# Patient Record
Sex: MALE | URBAN - METROPOLITAN AREA
[De-identification: names, ages, dates, MRNs, and addresses within clinical notes are randomized per-mention and may not be internally consistent; named-entity substitution may affect disease eponyms.]

---

## 2017-06-06 ENCOUNTER — IMPORTED ENCOUNTER (OUTPATIENT)
Dept: URBAN - METROPOLITAN AREA CLINIC 1 | Facility: CLINIC | Age: 77
End: 2017-06-06

## 2017-06-06 PROBLEM — H35.033: Noted: 2017-06-06

## 2017-06-06 PROBLEM — H25.813: Noted: 2017-06-06

## 2017-06-06 PROBLEM — H04.123: Noted: 2017-06-06

## 2017-06-06 PROBLEM — H43.813: Noted: 2017-06-06

## 2017-06-06 PROBLEM — R73.09: Noted: 2017-06-06

## 2017-06-06 PROCEDURE — 92014 COMPRE OPH EXAM EST PT 1/>: CPT

## 2017-06-06 NOTE — PATIENT DISCUSSION
1.  DM Type II (Diet Controlled) without sign of diabetic retinopathy and no blot heme on dilated retinal examination today OU No Macular Edema:  Discussed the pathophysiology of diabetes and its effect on the eye and risk of blindness. Stressed the importance of strong glucose control. Advised of importance of at least yearly dilated examinations but to contact us immediately for any problems or concerns. 2. Cataract OU: Observe for now without intervention. The patient was advised to contact us if any change or worsening of vision3. Dry Eyes OU - Cont ATs TID OU Routinely. 4.  PVD OU - RD precautions. 5.  Hypertensive Retinopathy OU- Stable continue HTN ControlLetter to PCP Return for an appointment in 1 yr 30 glare with Dr. Rashida Dinh.

## 2018-06-07 ENCOUNTER — IMPORTED ENCOUNTER (OUTPATIENT)
Dept: URBAN - METROPOLITAN AREA CLINIC 1 | Facility: CLINIC | Age: 78
End: 2018-06-07

## 2018-06-07 PROBLEM — H25.813: Noted: 2018-06-07

## 2018-06-07 PROBLEM — H35.033: Noted: 2018-06-07

## 2018-06-07 PROBLEM — Z79.84: Noted: 2018-06-07

## 2018-06-07 PROBLEM — H04.123: Noted: 2018-06-07

## 2018-06-07 PROBLEM — H43.813: Noted: 2018-06-07

## 2018-06-07 PROBLEM — E11.9: Noted: 2018-06-07

## 2018-06-07 PROBLEM — H16.143: Noted: 2018-06-07

## 2018-06-07 PROCEDURE — 92014 COMPRE OPH EXAM EST PT 1/>: CPT

## 2018-06-07 PROCEDURE — 92015 DETERMINE REFRACTIVE STATE: CPT

## 2018-06-07 NOTE — PATIENT DISCUSSION
1.  DM Type II (Controlled by oral medications - Metformin) -- Without sign of diabetic retinopathy and no blot heme on dilated retinal examination today OU No Macular Edema:  Discussed the pathophysiology of diabetes and its effect on the eye and risk of blindness. Stressed the importance of strong glucose control. Advised of importance of at least yearly dilated examinations but to contact us immediately for any problems or concerns. 2. Cataract OU: Observe for now without intervention. The patient was advised to contact us if any change or worsening of vision3. DAILY w/ PEK OU-The use/continuation of artificial tears were recommended. 4.  Hypertensive Retinopathy OU- Stable continue HTN Control5. PVD OU - RD precautions. Letter to Dr. Elinor Ro. Finalized Glasses MRx & given to patient. Return for an appointment in 6 MO 10 / Constantine Santiago / Jessica Mcintyre with Dr. Sierra Thomson.

## 2018-12-06 ENCOUNTER — IMPORTED ENCOUNTER (OUTPATIENT)
Dept: URBAN - METROPOLITAN AREA CLINIC 1 | Facility: CLINIC | Age: 78
End: 2018-12-06

## 2018-12-06 PROBLEM — H25.813: Noted: 2018-12-06

## 2018-12-06 PROBLEM — H35.033: Noted: 2018-12-06

## 2018-12-06 PROBLEM — Z79.84: Noted: 2018-12-06

## 2018-12-06 PROBLEM — H43.813: Noted: 2018-12-06

## 2018-12-06 PROBLEM — E11.9: Noted: 2018-12-06

## 2018-12-06 PROBLEM — H16.143: Noted: 2018-12-06

## 2018-12-06 PROBLEM — H04.123: Noted: 2018-12-06

## 2018-12-06 PROCEDURE — 99213 OFFICE O/P EST LOW 20 MIN: CPT

## 2018-12-06 NOTE — PATIENT DISCUSSION
1.  DM Type II without sign of diabetic retinopathy and no blot heme on dilated retinal examination today OU No Macular Edema: Controlled. Discussed the pathophysiology of diabetes and its effect on the eye and risk of blindness. Stressed the importance of strong glucose control. Advised of importance of at least yearly dilated examinations but to contact us immediately for any problems or concerns. 2. Type II diabetes controlled by oral medications. 3.  Cataract OU: Observe for now without intervention. The patient was advised to contact us if any change or worsening of vision4. DAILY w/ PEK OU-The importance of artificial tears were discussed and recommended OU TID. 5.  PVD OU- Old stable. 6.  GR I Hypertensive Retinopathy OU- Stable continue HTN Control7. Return for an appointment for 30/glare in 1 year with Dr. Viktoriya Newton.

## 2019-10-22 NOTE — PATIENT DISCUSSION
Jensen Visual Field 36 point screen: I have reviewed the visual fields both taped and untaped on this patient which demonstrate significant obstruction of the patient's peripheral visual field on both eyes.

## 2019-11-22 ENCOUNTER — ANESTHESIA EVENT (OUTPATIENT)
Dept: SURGERY | Age: 79
End: 2019-11-22
Payer: MEDICARE

## 2019-11-22 NOTE — PERIOP NOTES
PAT - SURGICAL PRE-ADMISSION INSTRUCTIONS    NAME:  Radha Jaime                                                          TODAY'S DATE:  11/22/2019    SURGERY DATE:  11/25/2019                                  SURGERY ARRIVAL TIME:   TBV    1. Do NOT eat or drink anything, including candy or gum, after MIDNIGHT on 11/24/2019 , unless you have specific instructions from your Surgeon or Anesthesia Provider to do so. 2. No smoking on the day of surgery. 3. No alcohol 24 hours prior to the day of surgery. 4. No recreational drugs for one week prior to the day of surgery. 5. Leave all valuables, including money/purse, at home. 6. Remove all jewelry, nail polish, makeup (including mascara); no lotions, powders, deodorant, or perfume/cologne/after shave. 7. Glasses/Contact lenses and Dentures may be worn to the hospital.  They will be removed prior to surgery. 8. Call your doctor if symptoms of a cold or illness develop within 24 ours prior to surgery. 9. AN ADULT MUST DRIVE YOU HOME AFTER OUTPATIENT SURGERY. 10. If you are having an OUTPATIENT procedure, please make arrangements for a responsible adult to be with you for 24 hours after your surgery. 11. If you are admitted to the hospital, you will be assigned to a bed after surgery is complete. Normally a family member will not be able to see you until you are in your assigned bed. 15. Family is encouraged to accompany you to the hospital.  We ask visitors in the treatment area to be limited to ONE person at a time to ensure patient privacy. EXCEPTIONS WILL BE MADE AS NEEDED. 15. Children under 12 are discouraged from entering the treatment area and need to be supervised by an adult when in the waiting room. Special Instructions: Take these medications the morning of surgery with a sip of water:  Adalat, HOLD oral diabetic medication on the MORNING OF surgery. , HOLD metformin/glucophage dose starting the EVENING BEFORE the day of surgery., STOP anticoagulants AT LEAST 1 WEEK PRIOR to your surgery or, follow other MD instructions:  No NSAIDS    Patient Prep:    shower with anti-bacterial soap    These surgical instructions were reviewed with Farideh Tellez during the PAT phone call. Directions: On the morning of surgery, please go to the [facility]. Enter the building from the [facility] entrance, 1st floor (next to the Emergency Room entrance). Take the elevator to the 2nd floor. Sign in at the Registration Desk.     If you have any questions and/or concerns, please do not hesitate to call:  (Prior to the day of surgery)  [facility] unit:  [phone]  (Day of surgery)  [facility] unit:  [phone]

## 2019-11-25 ENCOUNTER — ANESTHESIA (OUTPATIENT)
Dept: SURGERY | Age: 79
End: 2019-11-25
Payer: MEDICARE

## 2019-11-25 ENCOUNTER — HOSPITAL ENCOUNTER (OUTPATIENT)
Age: 79
Setting detail: OUTPATIENT SURGERY
Discharge: HOME | End: 2019-11-25
Attending: UROLOGY | Admitting: UROLOGY
Payer: MEDICARE

## 2019-11-25 VITALS
HEART RATE: 67 BPM | SYSTOLIC BLOOD PRESSURE: 115 MMHG | HEIGHT: 68 IN | OXYGEN SATURATION: 95 % | DIASTOLIC BLOOD PRESSURE: 74 MMHG | BODY MASS INDEX: 28.38 KG/M2 | TEMPERATURE: 97 F | RESPIRATION RATE: 18 BRPM | WEIGHT: 187.25 LBS

## 2019-11-25 LAB
GLUCOSE BLD STRIP.AUTO-MCNC: 131 MG/DL (ref 70–110)
GLUCOSE BLD STRIP.AUTO-MCNC: 144 MG/DL (ref 70–110)

## 2019-11-25 PROCEDURE — 76060000032 HC ANESTHESIA 0.5 TO 1 HR: Performed by: UROLOGY

## 2019-11-25 PROCEDURE — 74011250636 HC RX REV CODE- 250/636: Performed by: NURSE ANESTHETIST, CERTIFIED REGISTERED

## 2019-11-25 PROCEDURE — 74011250637 HC RX REV CODE- 250/637: Performed by: NURSE ANESTHETIST, CERTIFIED REGISTERED

## 2019-11-25 PROCEDURE — G0416 PROSTATE BIOPSY, ANY MTHD: HCPCS

## 2019-11-25 PROCEDURE — 77030003481 HC NDL BIOP GUN BARD -B: Performed by: UROLOGY

## 2019-11-25 PROCEDURE — 82962 GLUCOSE BLOOD TEST: CPT

## 2019-11-25 PROCEDURE — 76210000006 HC OR PH I REC 0.5 TO 1 HR: Performed by: UROLOGY

## 2019-11-25 PROCEDURE — 74011000250 HC RX REV CODE- 250: Performed by: NURSE ANESTHETIST, CERTIFIED REGISTERED

## 2019-11-25 PROCEDURE — 77030018846 HC SOL IRR STRL H20 ICUM -A: Performed by: UROLOGY

## 2019-11-25 PROCEDURE — 77030032490 HC SLV COMPR SCD KNE COVD -B: Performed by: UROLOGY

## 2019-11-25 PROCEDURE — 76010000138 HC OR TIME 0.5 TO 1 HR: Performed by: UROLOGY

## 2019-11-25 PROCEDURE — 88344 IMHCHEM/IMCYTCHM EA MLT ANTB: CPT

## 2019-11-25 PROCEDURE — 77030015736 HC BLLN ENDOCAV CIVC -B: Performed by: UROLOGY

## 2019-11-25 PROCEDURE — 77030012510 HC MSK AIRWY LMA TELE -B: Performed by: ANESTHESIOLOGY

## 2019-11-25 PROCEDURE — 74011250636 HC RX REV CODE- 250/636: Performed by: UROLOGY

## 2019-11-25 PROCEDURE — 76210000021 HC REC RM PH II 0.5 TO 1 HR: Performed by: UROLOGY

## 2019-11-25 PROCEDURE — 74011000250 HC RX REV CODE- 250: Performed by: UROLOGY

## 2019-11-25 RX ORDER — SODIUM CHLORIDE 0.9 % (FLUSH) 0.9 %
5-40 SYRINGE (ML) INJECTION EVERY 8 HOURS
Status: DISCONTINUED | OUTPATIENT
Start: 2019-11-25 | End: 2019-11-25 | Stop reason: HOSPADM

## 2019-11-25 RX ORDER — LIDOCAINE HYDROCHLORIDE 10 MG/ML
INJECTION INFILTRATION; PERINEURAL AS NEEDED
Status: DISCONTINUED | OUTPATIENT
Start: 2019-11-25 | End: 2019-11-25 | Stop reason: HOSPADM

## 2019-11-25 RX ORDER — LIDOCAINE HYDROCHLORIDE 10 MG/ML
0.1 INJECTION, SOLUTION EPIDURAL; INFILTRATION; INTRACAUDAL; PERINEURAL AS NEEDED
Status: DISCONTINUED | OUTPATIENT
Start: 2019-11-25 | End: 2019-11-25 | Stop reason: HOSPADM

## 2019-11-25 RX ORDER — ONDANSETRON 2 MG/ML
4 INJECTION INTRAMUSCULAR; INTRAVENOUS ONCE
Status: DISCONTINUED | OUTPATIENT
Start: 2019-11-25 | End: 2019-11-25 | Stop reason: HOSPADM

## 2019-11-25 RX ORDER — FENTANYL CITRATE 50 UG/ML
INJECTION, SOLUTION INTRAMUSCULAR; INTRAVENOUS AS NEEDED
Status: DISCONTINUED | OUTPATIENT
Start: 2019-11-25 | End: 2019-11-25 | Stop reason: HOSPADM

## 2019-11-25 RX ORDER — CEPHALEXIN 250 MG/1
500 CAPSULE ORAL 4 TIMES DAILY
Qty: 24 CAP | Refills: 0 | Status: SHIPPED | OUTPATIENT
Start: 2019-11-25 | End: 2019-11-28

## 2019-11-25 RX ORDER — ONDANSETRON 2 MG/ML
INJECTION INTRAMUSCULAR; INTRAVENOUS AS NEEDED
Status: DISCONTINUED | OUTPATIENT
Start: 2019-11-25 | End: 2019-11-25 | Stop reason: HOSPADM

## 2019-11-25 RX ORDER — TAMSULOSIN HYDROCHLORIDE 0.4 MG/1
0.4 CAPSULE ORAL DAILY
Qty: 90 CAP | Refills: 3 | Status: SHIPPED | OUTPATIENT
Start: 2019-11-25 | End: 2020-07-20

## 2019-11-25 RX ORDER — FENTANYL CITRATE 50 UG/ML
25 INJECTION, SOLUTION INTRAMUSCULAR; INTRAVENOUS AS NEEDED
Status: DISCONTINUED | OUTPATIENT
Start: 2019-11-25 | End: 2019-11-25 | Stop reason: HOSPADM

## 2019-11-25 RX ORDER — CEFAZOLIN SODIUM 2 G/50ML
2 SOLUTION INTRAVENOUS
Status: COMPLETED | OUTPATIENT
Start: 2019-11-25 | End: 2019-11-25

## 2019-11-25 RX ORDER — SODIUM CHLORIDE 0.9 % (FLUSH) 0.9 %
5-40 SYRINGE (ML) INJECTION AS NEEDED
Status: DISCONTINUED | OUTPATIENT
Start: 2019-11-25 | End: 2019-11-25 | Stop reason: HOSPADM

## 2019-11-25 RX ORDER — INSULIN LISPRO 100 [IU]/ML
INJECTION, SOLUTION INTRAVENOUS; SUBCUTANEOUS ONCE
Status: DISCONTINUED | OUTPATIENT
Start: 2019-11-25 | End: 2019-11-25 | Stop reason: HOSPADM

## 2019-11-25 RX ORDER — HYDROCODONE BITARTRATE AND ACETAMINOPHEN 5; 325 MG/1; MG/1
1 TABLET ORAL ONCE
Status: DISCONTINUED | OUTPATIENT
Start: 2019-11-25 | End: 2019-11-25 | Stop reason: HOSPADM

## 2019-11-25 RX ORDER — FAMOTIDINE 20 MG/1
20 TABLET, FILM COATED ORAL ONCE
Status: COMPLETED | OUTPATIENT
Start: 2019-11-25 | End: 2019-11-25

## 2019-11-25 RX ORDER — MAGNESIUM SULFATE 100 %
4 CRYSTALS MISCELLANEOUS AS NEEDED
Status: DISCONTINUED | OUTPATIENT
Start: 2019-11-25 | End: 2019-11-25 | Stop reason: HOSPADM

## 2019-11-25 RX ORDER — SODIUM CHLORIDE, SODIUM LACTATE, POTASSIUM CHLORIDE, CALCIUM CHLORIDE 600; 310; 30; 20 MG/100ML; MG/100ML; MG/100ML; MG/100ML
25 INJECTION, SOLUTION INTRAVENOUS CONTINUOUS
Status: DISCONTINUED | OUTPATIENT
Start: 2019-11-25 | End: 2019-11-25 | Stop reason: HOSPADM

## 2019-11-25 RX ORDER — PROPOFOL 10 MG/ML
INJECTION, EMULSION INTRAVENOUS AS NEEDED
Status: DISCONTINUED | OUTPATIENT
Start: 2019-11-25 | End: 2019-11-25 | Stop reason: HOSPADM

## 2019-11-25 RX ORDER — BACITRACIN 500 [USP'U]/G
OINTMENT TOPICAL AS NEEDED
Status: DISCONTINUED | OUTPATIENT
Start: 2019-11-25 | End: 2019-11-25 | Stop reason: HOSPADM

## 2019-11-25 RX ORDER — LIDOCAINE HYDROCHLORIDE 20 MG/ML
INJECTION, SOLUTION EPIDURAL; INFILTRATION; INTRACAUDAL; PERINEURAL AS NEEDED
Status: DISCONTINUED | OUTPATIENT
Start: 2019-11-25 | End: 2019-11-25 | Stop reason: HOSPADM

## 2019-11-25 RX ORDER — SODIUM CHLORIDE, SODIUM LACTATE, POTASSIUM CHLORIDE, CALCIUM CHLORIDE 600; 310; 30; 20 MG/100ML; MG/100ML; MG/100ML; MG/100ML
50 INJECTION, SOLUTION INTRAVENOUS CONTINUOUS
Status: DISCONTINUED | OUTPATIENT
Start: 2019-11-25 | End: 2019-11-25 | Stop reason: HOSPADM

## 2019-11-25 RX ADMIN — FENTANYL CITRATE 25 MCG: 50 INJECTION, SOLUTION INTRAMUSCULAR; INTRAVENOUS at 09:16

## 2019-11-25 RX ADMIN — LIDOCAINE HYDROCHLORIDE 50 MG: 20 INJECTION, SOLUTION INTRAVENOUS at 08:42

## 2019-11-25 RX ADMIN — FENTANYL CITRATE 50 MCG: 50 INJECTION, SOLUTION INTRAMUSCULAR; INTRAVENOUS at 08:46

## 2019-11-25 RX ADMIN — SODIUM CHLORIDE, SODIUM LACTATE, POTASSIUM CHLORIDE, AND CALCIUM CHLORIDE 25 ML/HR: 600; 310; 30; 20 INJECTION, SOLUTION INTRAVENOUS at 08:06

## 2019-11-25 RX ADMIN — ONDANSETRON 4 MG: 2 SOLUTION INTRAMUSCULAR; INTRAVENOUS at 09:05

## 2019-11-25 RX ADMIN — PROPOFOL 120 MG: 10 INJECTION, EMULSION INTRAVENOUS at 08:42

## 2019-11-25 RX ADMIN — FENTANYL CITRATE 25 MCG: 50 INJECTION, SOLUTION INTRAMUSCULAR; INTRAVENOUS at 09:04

## 2019-11-25 RX ADMIN — FAMOTIDINE 20 MG: 20 TABLET ORAL at 08:06

## 2019-11-25 RX ADMIN — CEFAZOLIN SODIUM 2 G: 2 SOLUTION INTRAVENOUS at 08:47

## 2019-11-25 NOTE — ANESTHESIA PREPROCEDURE EVALUATION
Relevant Problems   No relevant active problems       Anesthetic History   No history of anesthetic complications            Review of Systems / Medical History  Patient summary reviewed and pertinent labs reviewed    Pulmonary          Smoker         Neuro/Psych   Within defined limits           Cardiovascular    Hypertension: well controlled        Dysrhythmias : atrial fibrillation      Exercise tolerance: >4 METS     GI/Hepatic/Renal                Endo/Other    Diabetes: well controlled, type 2    Arthritis and cancer (Prostate cancer)     Other Findings              Physical Exam    Airway  Mallampati: II  TM Distance: 4 - 6 cm  Neck ROM: normal range of motion   Mouth opening: Normal     Cardiovascular    Rhythm: irregular           Dental    Dentition: Full lower dentures and Full upper dentures     Pulmonary  Breath sounds clear to auscultation               Abdominal  GI exam deferred       Other Findings            Anesthetic Plan    ASA: 3  Anesthesia type: general          Induction: Intravenous  Anesthetic plan and risks discussed with: Patient

## 2019-11-25 NOTE — PROCEDURES
TRANSRECTAL ULTRASOUND AND PROSTATE NEEDLE BIOPSY NOTE    PSA:    PSA measured 12.52 ng/mL on 9/5/19, previously 7.04ng/mL on 1/7/2019. Prebiopsy diagnosis: [id number] Adenocarcinoma of the prostate, Judith 3+3=6    Postbiopsy diagnosis:  Same    Procedure performed:  Transrectal US and Prostate Needle Biopsy    Surgeon:  Dr. Alisa Rojas   Assistant: Jose Luis Bella MD PGY 2     [place] Procedure Pause:  1. Correct patient confirmed:  yes  2. Allergies confirmed:  yes  3. Patient taking antiplatelet medications:  yes   4. Patient taking anticoagulants:  no  5. Correct procedure and side confirmed and consent signed:  yes  6. Correct antibiotics confirmed:  yes      Consent: All risks, benefits and options were reviewed in detail and the patient agrees to procedure. Risks include but are not limited to bleeding, infection, sepsis, death, dysuria and others. Procedure:  He was placed in the dorsal lithotomy and prepped in a sterile fashio. An endorectal probe was positioned on the brachytherapy stepper and placed in the rectum. Anesthesia: Prostate Block - 10 mL of Xylocaine 1%  was injected in an appropriate fashion. Transverse and sagittal images of the prostate and seminal vesicles were obtained and measurements were recorded (50.3 cm^3). Using the needle guide on the brachytherapy stepper, a biopsy needle was used to obtain biopsies of the prostate. There were two Regions of Interest based on the patient's MRI - the anterior peripheral zone and the left posterior peripheral zone. Additional samples of these regions were taken. Twelve other biopsies were then taken in the standard fashion with sample cores of the transitional zone on the left and right. Tissue cores were placed in formalin jars and sent for pathologic review. The probe was removed and the patient was observed. Appropriate activity levels and timing of resuming sexual activity was reviewed.   Patient was warned of potential complications and with instructions on how to contact our office. This includes but not limited to elevated temperature, excessive bleeding per urethra or rectum as well as others. All questions were answered to his satisfaction. Findings:    FLAQUITA: prostate is 1.5-2+ enlarged, firm with no nodules   TRUS Volume:  50 cm3   PSA-Density: 0.25  Prostate capsule:   Hypoechoic Mass:  YES Location:  Anterior peripheral zone, left posterior peripheral zone   Seminal vesicles:  Normal and symmetrical      Biopsy Pattern:     RANULFO 1 anterior PZ:   4  RANULFO 2 L posterior PZ:  4      Base  Mid   Hamilton  TZ   Left  1  1  1  1     Right    1  1  1  1  Lateral left 1  1  1  0  Lateral right 1  1  1  0         Plan:   Follow up in 10 days to review biopsy results         Renetta Nickerson MD      Staff:  I performed this procedure with the assistance of Dr. Jazmin Jaime. I agree with her assessment and documentation of the procedure as presented above.     Camelia Stoddard MD

## 2019-11-25 NOTE — DISCHARGE INSTRUCTIONS
Blood in the urine is normal for several days and blood in the semen can be expected up to 4-6 weeks. Infection is also another risk which we attempted to minimize with antibiotic coverage. If you develop fevers please call the office/ED. Patient armband removed and given to patient to take home. Patient was informed of the privacy risks if armband lost or stolen    DISCHARGE SUMMARY from Nurse    PATIENT INSTRUCTIONS:    After general anesthesia or intravenous sedation, for 24 hours or while taking prescription Narcotics:  · Limit your activities  · Do not drive and operate hazardous machinery  · Do not make important personal or business decisions  · Do  not drink alcoholic beverages  · If you have not urinated within 8 hours after discharge, please contact your surgeon on call. Report the following to your surgeon:  · Excessive pain, swelling, redness or odor of or around the surgical area  · Temperature over 100.5  · Nausea and vomiting lasting longer than 4 hours or if unable to take medications  · Any signs of decreased circulation or nerve impairment to extremity: change in color, persistent  numbness, tingling, coldness or increase pain  · Any questions    What to do at Home:    These are general instructions for a healthy lifestyle:    No smoking/ No tobacco products/ Avoid exposure to second hand smoke  Surgeon General's Warning:  Quitting smoking now greatly reduces serious risk to your health. Obesity, smoking, and sedentary lifestyle greatly increases your risk for illness    A healthy diet, regular physical exercise & weight monitoring are important for maintaining a healthy lifestyle    You may be retaining fluid if you have a history of heart failure or if you experience any of the following symptoms:  Weight gain of 3 pounds or more overnight or 5 pounds in a week, increased swelling in our hands or feet or shortness of breath while lying flat in bed.   Please call your doctor as soon as you notice any of these symptoms; do not wait until your next office visit. The discharge information has been reviewed with the patient. The patient verbalized understanding. Discharge medications reviewed with the patient and appropriate educational materials and side effects teaching were provided.   ___________________________________________________________________________________________________________________________________

## 2019-11-25 NOTE — H&P
Abdifatah Jaime   1940  70 y.o.  2019              Encounter Diagnoses       ICD-10-CM ICD-9-CM   1. BPH with obstruction/lower urinary tract symptoms N40.1 600.01     N13.8 599.69   2. Prostate cancer ([facility] 75.) C61 185         Assessment:  1. [id number] Adenocarcinoma of the prostate, Judith 3+3=6 in 1/12 cores, diagnosed 6/15/2018 with presenting PSA 5.10ng/mL. Volume 45.2 cm3                On active surveillance since 6/15/18. S/p 1st surveillance biopsy on 19: negative for prostate cancer              PSA measured 12.52 ng/mL on 19, previously 7.04ng/mL on 2019.      2. BPH with LUTS. TRUS vol 54.33 on 2019. On Flomax 0.4 mg.         Plan:  -Recommend MRI prostate to assess for suspicious lesions. -MRI completed and there was a 6 mm PiRAD 4 mid apical prostate lesion and a 1.5 cm PiRAD 3 left apex to mid posterior prostate. With these findings I discussed MRI Fusion biopsy but we are scheduled until January with these specialized biopsies. I feel a transperineal biopsy will be just as good for these areas of concern. Patient is in agreement  Discussed possibility of bleeding in particular due to the vicinity of the anterior RANULFO.          Chief Complaint   Patient presents with    Prostate Cancer         History of Present Illness: Katelin Santo is a 70 y.o. male who presents today pre-operatively.      Pt is scheduled for transperineal prostate biopsy 19.      Pt was diagnosed with [id number] Judith 3+3 Adenocarcinoma of the Prostate in 6/15/2018 with presenting PSA 5.10ng/mL. He is currently on active surveillance and had his 1st surveillance biopsy on 19 which was negative for prostate cancer. No family history of prostate cancer. PSA increased to 12.52 ng/mL on 19, previously 7.04ng/mL on 2019.      Patient is doing well today. He has no complaints. Has no urological issues he'd like to discuss.      Pmh Afib.  On ASA 81.  NIDDM  HTN        Previous knee replacement >5 years ago      PSA Trend (ng/ml):  PSA /TESTOSTERONE - BSHSI PSA   Latest Ref Rng & Units 0.00 - 4.00 ng/mL   9/5/2019 12.52 (H)   1/7/2019 7.04 (H)   10/16/2018 5.20 (H)   5/3/2018 5.10 (H)   1/19/2018 4.4   2/19/2016 4.16 (H)   1/5/2016 4.3           Past Medical History:   Diagnosis Date    [facility]      BPH with obstruction/lower urinary tract symptoms      Elevated cholesterol      Elevated PSA      H/O non-insulin dependent diabetes mellitus      HTN (hypertension)      Osteoarthritis      Prostate cancer ([facility] 06/15/2018     [id number] [place] 3+3 Adenocarcinoma of the Prostate in 1/12 cores. Pre-bx PSA 5.10 ng/mL         Past Surgical History        Past Surgical History:   Procedure Laterality Date    HX OTHER SURGICAL         Sinus    HX UROLOGICAL   02/25/2019     pnbx-trus vol 54.33cc. path benign. Dr. Bakari Fernandez HX UROLOGICAL   06/15/2018     pnbx-trus vol 45.2cc. path: GS 3+3 in 3% [place]. Dr. Cyndee Lira         Family History  History reviewed. No pertinent family history.     Social History  Social History            Socioeconomic History    Marital status:        Spouse name: Not on file    Number of children: Not on file    Years of education: Not on file    Highest education level: Not on file   Occupational History    Not on file   Social Needs    Financial resource strain: Not on file    Food insecurity:       Worry: Not on file       Inability: Not on file    Transportation needs:       Medical: Not on file       Non-medical: Not on file   Tobacco Use    Smoking status: Current Every Day Smoker       Packs/day: 1.00       Years: 50.00       Pack years: 50.00       Types: Cigarettes    Smokeless tobacco: Current User   Substance and Sexual Activity    Alcohol use:  No       Alcohol/week: 0.0 standard drinks    Drug use: No    Sexual activity: Not on file   Lifestyle    Physical activity:       Days per week: Not on file       Minutes per session: Not on file    Stress: Not on file   Relationships    Social connections:       Talks on phone: Not on file       Gets together: Not on file       Attends Bahai service: Not on file       Active member of club or organization: Not on file       Attends meetings of clubs or organizations: Not on file       Relationship status: Not on file    Intimate partner violence:       Fear of current or ex partner: Not on file       Emotionally abused: Not on file       Physically abused: Not on file       Forced sexual activity: Not on file   Other Topics Concern    Not on file   Social History Narrative    Not on file         No Known Allergies       Current Outpatient Medications   Medication Sig    VOLTAREN 1 % gel      hydroCHLOROthiazide (HYDRODIURIL) 25 mg tablet      meloxicam (MOBIC) 7.5 mg tablet      telmisartan (MICARDIS) 20 mg tablet      potassium chloride (KLOR-CON) 10 mEq tablet      ADALAT CC 90 mg ER tablet      ergocalciferol (ERGOCALCIFEROL) 50,000 unit capsule      tamsulosin (FLOMAX) 0.4 mg capsule Take 0.4 mg by mouth daily.  Aspirin, Buffered 81 mg tab Take  by mouth.  metFORMIN (GLUCOPHAGE) 500 mg tablet Take  by mouth two (2) times daily (with meals).  simvastatin (ZOCOR) 40 mg tablet Take  by mouth nightly.  VIAGRA 100 mg tablet        No current facility-administered medications for this visit.          Review of Systems  Constitutional: Fever: No  Skin: Rash: No  HEENT: Hearing difficulty: No  Eyes: Blurred vision: No  Cardiovascular: Chest pain: No  Respiratory: Shortness of breath: No  Gastrointestinal: Nausea/vomiting: No  Musculoskeletal: Back pain: Yes  Neurological: Weakness: No  Psychological: Memory loss: No  Comments/additional findings:         Physical Exam:  Visit Vitals  /64   Ht 5' 9\" (1.753 m)   Wt 185 lb (83.9 kg)   BMI 27.32 kg/m²      Constitutional: WDWN, No acute distress.     HEENT: EOMs in tact  CV:  No peripheral swelling noted  Respiratory: No respiratory distress or difficulties  Skin: No jaundice.  male  FLAQUITA: 11/8/19: prostate is 1.5-2+ enlarged, firm with no nodules   EXT: no clubbing or cyanosis          Labs reviewed today:         Results for orders placed or performed in visit on 11/08/19   AMB POC PVR, ROLAN,POST-VOID RES,US,NON-IMAGING   Result Value Ref Range     PVR 3 cc   AMB POC URINALYSIS DIP STICK AUTO W/O MICRO   Result Value Ref Range     Color (UA POC) Yellow       Clarity (UA POC) Clear       Glucose (UA POC) Negative Negative     Bilirubin (UA POC) Negative Negative     Ketones (UA POC) Negative Negative     Specific gravity (UA POC) 1.025 1.001 - 1.035     Blood (UA POC) 1+ Negative     pH (UA POC) 5.0 4.6 - 8.0     Protein (UA POC) 1+ Negative     Urobilinogen (UA POC) 0.2 mg/dL 0.2 - 1     Nitrites (UA POC) Negative Negative     Leukocyte esterase (UA POC) Negative Negative         PSA  Component Prostate Specific Ag   Latest Ref Rng & Units 0.00 - 4.00 ng/mL   9/5/2019 12.52   10/16/2018 5.20 (H)   5/3/2018 5.10 (H)   1/19/2018 4.4   2/19/2016 4.16 (H)   1/5/2016 4.3         TRUS Pathology 2/25/2019  DIAGNOSIS:   A. Right [place] Needle biopsy                  Benign prostatic tissue. B. Right [place] #2 Needle biopsy                  Benign prostatic tissue. C. Right Mid Needle biopsy                  Benign prostatic tissue. D. Right Mid #2 Needle biopsy                  Benign prostatic tissue. E. Right Base Needle biopsy                  Benign prostatic tissue. F. Right Base #2 Needle biopsy                  Benign prostatic tissue. G. Left [place] Needle biopsy                  Benign prostatic tissue. H. Left [place] #2 Needle biopsy                  Benign prostatic tissue. I. Left Mid Needle biopsy                  Benign prostatic tissue. J. Left Mid #2 Needle biopsy                  Benign prostatic tissue.    K. Left Base Needle biopsy                  Benign prostatic tissue.    L. Left Base #2 Needle biopsy                  Benign prostatic tissue.         Hilaria Osler, MD  [facility] [place] [place]

## 2019-11-25 NOTE — ANESTHESIA POSTPROCEDURE EVALUATION
Procedure(s):  Transperineal Prostate Biopsy.     general    Anesthesia Post Evaluation      Multimodal analgesia: multimodal analgesia used between 6 hours prior to anesthesia start to PACU discharge  Patient location during evaluation: bedside  Patient participation: complete - patient participated  Level of consciousness: awake  Pain management: adequate  Airway patency: patent  Anesthetic complications: no  Cardiovascular status: stable  Respiratory status: acceptable  Hydration status: acceptable  Post anesthesia nausea and vomiting:  controlled      Vitals Value Taken Time   /74 11/25/2019 10:24 AM   Temp 36.1 °C (97 °F) 11/25/2019  9:33 AM   Pulse 67 11/25/2019 10:24 AM   Resp 18 11/25/2019 10:24 AM   SpO2 95 % 11/25/2019 10:24 AM

## 2019-11-25 NOTE — PERIOP NOTES
Phase 2 Recovery Summary    Report received from [facility]    Vitals:    11/25/19 1007 11/25/19 1012 11/25/19 1017 11/25/19 1024   BP: 120/65 121/68 122/62 115/74   Pulse: 65 65 64 67   Resp: 18 15 19 18   Temp:       SpO2: 96% 97% 97% 95%   Weight:       Height:           oriented to time, place, person and situation. Patient stated his prescription for Flomax has run out and needs new prescription. New prescription from Dr. Maria Teresa Kahn for Flomax given to patient. Lines and Drains  Peripheral Intravenous Removed prior to discharge  Peripheral IV 11/25/19 Right Other(comment) (Active)   Site Assessment Clean, dry, & intact 11/25/2019  9:52 AM   Phlebitis Assessment 0 11/25/2019  9:52 AM   Infiltration Assessment 0 11/25/2019  9:52 AM   Dressing Status Clean, dry, & intact 11/25/2019  9:52 AM   Dressing Type Transparent;Tape 11/25/2019  9:52 AM   Hub Color/Line Status Pink; Infusing 11/25/2019  9:52 AM   Action Taken Open ports on tubing capped 11/25/2019  8:13 AM   Alcohol Cap Used Yes 11/25/2019  8:13 AM       Wound  Wound Perineum (Active)   Dressing Status Clean 11/25/2019 10:48 AM   Dressing Type Open to air; Other (Comment) 11/25/2019  9:52 AM   Number of days: 0        Patient assisted to chair with minimal assist. Pateint tolerating liquids well. Patient's family at bedside. Discharge discussed with patient and family. No questions or concerns at this time. Patient had time to ask any questions. Patient discharged to home, with grandson.       Emerald Adkins RN

## 2019-11-25 NOTE — PERIOP NOTES
Pre-Op Summary    Pt arrived via car with family/friend and is oriented to time, place, person and situation. Patient with steady gait with none assistive devices. Visit Vitals  /66 (BP 1 Location: Right arm, BP Patient Position: Sitting)   Pulse 82   Temp 98 °F (36.7 °C)   Resp 16   Ht 5' 8\" (1.727 m)   Wt 84.9 kg (187 lb 4 oz)   SpO2 98%   BMI 28.47 kg/m²       Peripheral IV located on Right upper arm . Patients belongings are located with grandson. Patient's point of contact is Chidi Andrew and their contact number is: [phone]. They will be in the waiting room. They are able to receive medication information. They will be their ride home.

## 2019-12-05 ENCOUNTER — IMPORTED ENCOUNTER (OUTPATIENT)
Dept: URBAN - METROPOLITAN AREA CLINIC 1 | Facility: CLINIC | Age: 79
End: 2019-12-05

## 2019-12-05 PROBLEM — E11.9: Noted: 2019-12-05

## 2019-12-05 PROBLEM — Z79.84: Noted: 2019-12-05

## 2019-12-05 PROBLEM — H25.813: Noted: 2019-12-05

## 2019-12-05 PROCEDURE — 92014 COMPRE OPH EXAM EST PT 1/>: CPT

## 2019-12-05 NOTE — PATIENT DISCUSSION
1.  DM Type II (Oral Meds) without sign of diabetic retinopathy and no blot heme on dilated retinal examination today OU No Macular Edema:  Discussed the pathophysiology of diabetes and its effect on the eye and risk of blindness. Stressed the importance of strong glucose control. Advised of importance of at least yearly dilated examinations but to contact us immediately for any problems or concerns. 2. Cataract OU: Observe for now without intervention. The patient was advised to contact us if any change or worsening of vision3. DAILY w/ PEK OU- Cont ATs TID OU Routinely. 4.  PVD OU- Stable RD precautions. 5.  GR I Hypertensive Retinopathy OU- Stable continue HTN ControlLetter to PCP MRx deferred Return for an appointment in 1 yr 30 glare with Dr. Reymundo Núñez.

## 2019-12-10 NOTE — PATIENT DISCUSSION
Also, please do not hesitate to call us if you have any concerns not addressed by this information. Please call [phone] and we will do everything we can to help you during this period.

## 2019-12-10 NOTE — PATIENT DISCUSSION
Resume normal activity. Resume any medications that were discontinued for surgery. Stop cold compresses and start hot compresses to affected lid(s) 2-3 times daily for one month, 5 minutes at a time. Artificial tears prn burning/itching/blurry vision. Wash incisions/ lash line once daily with baby shampoo.

## 2019-12-16 ENCOUNTER — HOSPITAL ENCOUNTER (OUTPATIENT)
Dept: LAB | Age: 79
Discharge: HOME | End: 2019-12-16

## 2020-02-13 ENCOUNTER — HOSPITAL ENCOUNTER (OUTPATIENT)
Dept: RADIATION THERAPY | Age: 80
Discharge: HOME | End: 2020-02-13
Payer: MEDICARE

## 2020-02-13 ENCOUNTER — HOSPITAL ENCOUNTER (OUTPATIENT)
Dept: LAB | Age: 80
Discharge: HOME | End: 2020-02-13
Payer: MEDICARE

## 2020-02-13 DIAGNOSIS — C61 MALIGNANT NEOPLASM OF PROSTATE (HCC): ICD-10-CM

## 2020-02-13 LAB
ANION GAP SERPL CALC-SCNC: 7 MMOL/L (ref 3–18)
APPEARANCE UR: CLEAR
BACTERIA URNS QL MICRO: NEGATIVE /HPF
BASOPHILS # BLD: 0 K/UL (ref 0–0.1)
BASOPHILS NFR BLD: 0 % (ref 0–2)
BILIRUB UR QL: NEGATIVE
BUN SERPL-MCNC: 33 MG/DL (ref 7–18)
BUN/CREAT SERPL: 18 (ref 12–20)
CALCIUM SERPL-MCNC: 9.1 MG/DL (ref 8.5–10.1)
CHLORIDE SERPL-SCNC: 106 MMOL/L (ref 100–111)
CO2 SERPL-SCNC: 28 MMOL/L (ref 21–32)
COLOR UR: YELLOW
CREAT SERPL-MCNC: 1.84 MG/DL (ref 0.6–1.3)
DIFFERENTIAL METHOD BLD: ABNORMAL
EOSINOPHIL # BLD: 0.5 K/UL (ref 0–0.4)
EOSINOPHIL NFR BLD: 6 % (ref 0–5)
EPITH CASTS URNS QL MICRO: ABNORMAL /LPF (ref 0–5)
ERYTHROCYTE [DISTWIDTH] IN BLOOD BY AUTOMATED COUNT: 12.6 % (ref 11.6–14.5)
GLUCOSE SERPL-MCNC: 91 MG/DL (ref 74–99)
GLUCOSE UR STRIP.AUTO-MCNC: NEGATIVE MG/DL
HCT VFR BLD AUTO: 41.3 % (ref 36–48)
HGB BLD-MCNC: 13.2 G/DL (ref 13–16)
HGB UR QL STRIP: NEGATIVE
KETONES UR QL STRIP.AUTO: ABNORMAL MG/DL
LEUKOCYTE ESTERASE UR QL STRIP.AUTO: ABNORMAL
LYMPHOCYTES # BLD: 2 K/UL (ref 0.9–3.6)
LYMPHOCYTES NFR BLD: 23 % (ref 21–52)
MCH RBC QN AUTO: 30.4 PG (ref 24–34)
MCHC RBC AUTO-ENTMCNC: 32 G/DL (ref 31–37)
MCV RBC AUTO: 95.2 FL (ref 74–97)
MONOCYTES # BLD: 0.9 K/UL (ref 0.05–1.2)
MONOCYTES NFR BLD: 10 % (ref 3–10)
NEUTS SEG # BLD: 5.2 K/UL (ref 1.8–8)
NEUTS SEG NFR BLD: 61 % (ref 40–73)
NITRITE UR QL STRIP.AUTO: NEGATIVE
PH UR STRIP: 5 [PH] (ref 5–8)
PLATELET # BLD AUTO: 175 K/UL (ref 135–420)
PMV BLD AUTO: 14.5 FL (ref 9.2–11.8)
POTASSIUM SERPL-SCNC: 4.1 MMOL/L (ref 3.5–5.5)
PROT UR STRIP-MCNC: NEGATIVE MG/DL
PSA SERPL-MCNC: 7.1 NG/ML (ref 0–4)
RBC # BLD AUTO: 4.34 M/UL (ref 4.7–5.5)
RBC #/AREA URNS HPF: ABNORMAL /HPF (ref 0–5)
SODIUM SERPL-SCNC: 141 MMOL/L (ref 136–145)
SP GR UR REFRACTOMETRY: 1.02 (ref 1–1.03)
UROBILINOGEN UR QL STRIP.AUTO: 0.2 EU/DL (ref 0.2–1)
WBC # BLD AUTO: 8.6 K/UL (ref 4.6–13.2)
WBC URNS QL MICRO: ABNORMAL /HPF (ref 0–4)

## 2020-02-13 PROCEDURE — 99211 OFF/OP EST MAY X REQ PHY/QHP: CPT

## 2020-02-13 PROCEDURE — 84403 ASSAY OF TOTAL TESTOSTERONE: CPT

## 2020-02-13 PROCEDURE — 36415 COLL VENOUS BLD VENIPUNCTURE: CPT

## 2020-02-13 PROCEDURE — 84153 ASSAY OF PSA TOTAL: CPT

## 2020-02-13 PROCEDURE — 81001 URINALYSIS AUTO W/SCOPE: CPT

## 2020-02-13 PROCEDURE — 87086 URINE CULTURE/COLONY COUNT: CPT

## 2020-02-13 PROCEDURE — 80048 BASIC METABOLIC PNL TOTAL CA: CPT

## 2020-02-13 PROCEDURE — 93005 ELECTROCARDIOGRAM TRACING: CPT

## 2020-02-13 PROCEDURE — 85025 COMPLETE CBC W/AUTO DIFF WBC: CPT

## 2020-02-14 LAB
ATRIAL RATE: 87 BPM
CALCULATED P AXIS, ECG09: 74 DEGREES
CALCULATED R AXIS, ECG10: 55 DEGREES
CALCULATED T AXIS, ECG11: 70 DEGREES
DIAGNOSIS, 93000: NORMAL
P-R INTERVAL, ECG05: 192 MS
Q-T INTERVAL, ECG07: 358 MS
QRS DURATION, ECG06: 80 MS
QTC CALCULATION (BEZET), ECG08: 430 MS
VENTRICULAR RATE, ECG03: 87 BPM

## 2020-02-15 LAB
BACTERIA SPEC CULT: NORMAL
SERVICE CMNT-IMP: NORMAL
TESTOST SERPL-MCNC: 109 NG/DL (ref 264–916)

## 2020-02-17 ENCOUNTER — ANESTHESIA EVENT (OUTPATIENT)
Dept: RADIATION THERAPY | Age: 80
End: 2020-02-17
Payer: MEDICARE

## 2020-02-17 ENCOUNTER — HOSPITAL ENCOUNTER (OUTPATIENT)
Dept: RADIATION THERAPY | Age: 80
Discharge: HOME | End: 2020-02-17
Payer: MEDICARE

## 2020-02-17 ENCOUNTER — ANESTHESIA (OUTPATIENT)
Dept: RADIATION THERAPY | Age: 80
End: 2020-02-17
Payer: MEDICARE

## 2020-02-17 VITALS
RESPIRATION RATE: 11 BRPM | HEART RATE: 74 BPM | TEMPERATURE: 97.5 F | DIASTOLIC BLOOD PRESSURE: 67 MMHG | WEIGHT: 182.13 LBS | SYSTOLIC BLOOD PRESSURE: 114 MMHG | OXYGEN SATURATION: 96 % | HEIGHT: 68 IN | BODY MASS INDEX: 27.6 KG/M2

## 2020-02-17 LAB
GLUCOSE BLD STRIP.AUTO-MCNC: 132 MG/DL (ref 70–110)
GLUCOSE BLD STRIP.AUTO-MCNC: 89 MG/DL (ref 70–110)

## 2020-02-17 PROCEDURE — 77030018846 HC SOL IRR STRL H20 ICUM -A

## 2020-02-17 PROCEDURE — 73290000068 HC RAD ONC TIME 0.5 TO 1 HR

## 2020-02-17 PROCEDURE — 77030013079 HC BLNKT BAIR HGGR 3M -A: Performed by: NURSE ANESTHETIST, CERTIFIED REGISTERED

## 2020-02-17 PROCEDURE — 74011250637 HC RX REV CODE- 250/637: Performed by: RADIOLOGY

## 2020-02-17 PROCEDURE — 77030012510 HC MSK AIRWY LMA TELE -B: Performed by: NURSE ANESTHETIST, CERTIFIED REGISTERED

## 2020-02-17 PROCEDURE — 82962 GLUCOSE BLOOD TEST: CPT

## 2020-02-17 PROCEDURE — 51798 US URINE CAPACITY MEASURE: CPT

## 2020-02-17 PROCEDURE — 77030036874 HC SPCR RECTAL HYDRGEL SPACEOAR AGMX -I

## 2020-02-17 PROCEDURE — 77030015736 HC BLLN ENDOCAV CIVC -B

## 2020-02-17 PROCEDURE — 74011250636 HC RX REV CODE- 250/636: Performed by: NURSE ANESTHETIST, CERTIFIED REGISTERED

## 2020-02-17 PROCEDURE — 74011250636 HC RX REV CODE- 250/636: Performed by: RADIOLOGY

## 2020-02-17 PROCEDURE — 74011000250 HC RX REV CODE- 250: Performed by: NURSE ANESTHETIST, CERTIFIED REGISTERED

## 2020-02-17 PROCEDURE — 76060000032 HC ANESTHESIA 0.5 TO 1 HR

## 2020-02-17 PROCEDURE — A4648 IMPLANTABLE TISSUE MARKER: HCPCS

## 2020-02-17 RX ORDER — PROPOFOL 10 MG/ML
INJECTION, EMULSION INTRAVENOUS AS NEEDED
Status: DISCONTINUED | OUTPATIENT
Start: 2020-02-17 | End: 2020-02-17 | Stop reason: HOSPADM

## 2020-02-17 RX ORDER — SODIUM CHLORIDE 9 MG/ML
75 INJECTION, SOLUTION INTRAVENOUS CONTINUOUS
Status: DISCONTINUED | OUTPATIENT
Start: 2020-02-17 | End: 2020-02-21 | Stop reason: HOSPADM

## 2020-02-17 RX ORDER — SODIUM CHLORIDE 0.9 % (FLUSH) 0.9 %
5-10 SYRINGE (ML) INJECTION ONCE
Status: COMPLETED | OUTPATIENT
Start: 2020-02-17 | End: 2020-02-17

## 2020-02-17 RX ORDER — GLYCOPYRROLATE 0.2 MG/ML
INJECTION INTRAMUSCULAR; INTRAVENOUS AS NEEDED
Status: DISCONTINUED | OUTPATIENT
Start: 2020-02-17 | End: 2020-02-17 | Stop reason: HOSPADM

## 2020-02-17 RX ORDER — CEFAZOLIN SODIUM 2 G/50ML
2 SOLUTION INTRAVENOUS ONCE
Status: COMPLETED | OUTPATIENT
Start: 2020-02-17 | End: 2020-02-17

## 2020-02-17 RX ORDER — LIDOCAINE HYDROCHLORIDE 20 MG/ML
INJECTION, SOLUTION EPIDURAL; INFILTRATION; INTRACAUDAL; PERINEURAL AS NEEDED
Status: DISCONTINUED | OUTPATIENT
Start: 2020-02-17 | End: 2020-02-17 | Stop reason: HOSPADM

## 2020-02-17 RX ORDER — FENTANYL CITRATE 50 UG/ML
INJECTION, SOLUTION INTRAMUSCULAR; INTRAVENOUS AS NEEDED
Status: DISCONTINUED | OUTPATIENT
Start: 2020-02-17 | End: 2020-02-17 | Stop reason: HOSPADM

## 2020-02-17 RX ORDER — DEXAMETHASONE SODIUM PHOSPHATE 4 MG/ML
INJECTION, SOLUTION INTRA-ARTICULAR; INTRALESIONAL; INTRAMUSCULAR; INTRAVENOUS; SOFT TISSUE AS NEEDED
Status: DISCONTINUED | OUTPATIENT
Start: 2020-02-17 | End: 2020-02-17 | Stop reason: HOSPADM

## 2020-02-17 RX ORDER — FAMOTIDINE 20 MG/1
20 TABLET, FILM COATED ORAL ONCE
Status: COMPLETED | OUTPATIENT
Start: 2020-02-17 | End: 2020-02-17

## 2020-02-17 RX ORDER — ONDANSETRON 2 MG/ML
INJECTION INTRAMUSCULAR; INTRAVENOUS AS NEEDED
Status: DISCONTINUED | OUTPATIENT
Start: 2020-02-17 | End: 2020-02-17 | Stop reason: HOSPADM

## 2020-02-17 RX ADMIN — FAMOTIDINE 20 MG: 20 TABLET ORAL at 07:49

## 2020-02-17 RX ADMIN — FENTANYL CITRATE 25 MCG: 50 INJECTION, SOLUTION INTRAMUSCULAR; INTRAVENOUS at 08:41

## 2020-02-17 RX ADMIN — FENTANYL CITRATE 25 MCG: 50 INJECTION, SOLUTION INTRAMUSCULAR; INTRAVENOUS at 08:56

## 2020-02-17 RX ADMIN — ONDANSETRON 4 MG: 2 SOLUTION INTRAMUSCULAR; INTRAVENOUS at 08:53

## 2020-02-17 RX ADMIN — SODIUM CHLORIDE 75 ML/HR: 900 INJECTION, SOLUTION INTRAVENOUS at 07:49

## 2020-02-17 RX ADMIN — FENTANYL CITRATE 25 MCG: 50 INJECTION, SOLUTION INTRAMUSCULAR; INTRAVENOUS at 09:03

## 2020-02-17 RX ADMIN — DEXAMETHASONE SODIUM PHOSPHATE 8 MG: 4 INJECTION, SOLUTION INTRA-ARTICULAR; INTRALESIONAL; INTRAMUSCULAR; INTRAVENOUS; SOFT TISSUE at 08:53

## 2020-02-17 RX ADMIN — LIDOCAINE HYDROCHLORIDE 60 MG: 20 INJECTION, SOLUTION INTRAVENOUS at 08:46

## 2020-02-17 RX ADMIN — GLYCOPYRROLATE 0.2 MG: 0.2 INJECTION INTRAMUSCULAR; INTRAVENOUS at 08:41

## 2020-02-17 RX ADMIN — FENTANYL CITRATE 25 MCG: 50 INJECTION, SOLUTION INTRAMUSCULAR; INTRAVENOUS at 08:42

## 2020-02-17 RX ADMIN — Medication 10 ML: at 07:49

## 2020-02-17 RX ADMIN — CEFAZOLIN SODIUM 2 G: 2 SOLUTION INTRAVENOUS at 08:53

## 2020-02-17 RX ADMIN — PROPOFOL 130 MG: 10 INJECTION, EMULSION INTRAVENOUS at 08:46

## 2020-02-17 NOTE — PROGRESS NOTES
Fiducial Markers Instructions    During the first few days you may have some bruising on the perineum (the place between your anus and your scrotum) or on the scrotum itself. This is caused by the needles (usually 2-3) which are used to place the Fiducial Markers. This will resolve on its own and usually does not cause any discomfort. For about a week after treatment, you may have some pain or swelling in the area between your scrotum and rectum, and your urine may be reddish-brown. Rarely a larger hematoma may form on the perineum and this will cause some discomfort with sitting. This should be brought to the doctors attention, but it will resolve on its own. You can alternate between an ice pack and heat pack for 10 minutes interval to assist with the reduction of inflammation and pain to perineum. Side Effects    Immediately post procedure: blood in the urine, bruising/tenderness/discoloration at the implant site, and/or swelling at the implant site. These symptoms should subside after a few days. Diet:      Regular, unless you are on a special diet for other reasons.      Medication:     Patient may continue the following medications as directed by Physician:     Adalat CC (90 mg) Tablet SR 24 HR Oral daily (2/12/2020)  Adult Aspirin EC Low Strength (81 mg) Tablet, enteric coated Oral daily (2/12/2020)  Ciprofloxacin HCl (500 mg) Tablet Oral Take as Directed (2/13/2020)  Ergocalciferol (1. 25 MG ) Capsule Oral q 7 days (2/12/2020)  Fleet Enema (7-19 gm/118mL) Enema Rectal Take as Directed (2/13/2020)  Flomax (0. 4 mg) Capsule Oral daily (2/12/2020)  Fluocinonide (0. 05 %) Cream Topical PRN (2/12/2020)  Glucophage 2 Tablet (of 500 mg) Tablet Oral b. i. d. (2/12/2020)  HydroCHLOROthiazide (25 mg) Tablet Oral daily (2/12/2020)  Klor-Con M10 (10 meq) Tablet, controlled release Oral daily (2/12/2020)  Micardis (20 mg) Tablet Oral daily (2/12/2020)  Mobic (7. 5 mg/5mL) Suspension Oral daily (2/12/2020)  Naproxen (500 mg) Tablet Oral Take as Directed (2/13/2020)  Simvastatin (40 mg) Tablet Oral daily (2/12/2020)  Viagra (100 mg) Tablet Oral PRN (2/12/2020)  Voltaren (1 %) Gel (jelly) Transdermal PRN (2/12/2020)     Activity:     Avoid heavy lifting or strenuous physical activity for the first two days after the procedure. At that time you may return to your normal activity level if the urine is clear and you feel fine. If the urine is still bloody you should rest and drink plenty of fluids until it is clear, and then you may resume normal activity. Depending on the demands of your job, you may return to work any time during the week after the procedure, noting the precaution about prolonged sitting. You may return to a regular diet as tolerated following the procedure. You will most likely experience a reddish color in your ejaculate for a few weeks following the procedure. This is normal and will improve as time  passes, if it persists, see your doctor. Follow up     Your follow up imaging will be at [facility] at [facility]  on _____at ____ am.    Please call us if you have any questions: [phone]    Radiation Therapy       DISCHARGE SUMMARY from Nurse    PATIENT INSTRUCTIONS:    After general anesthesia or intravenous sedation, for 24 hours or while taking prescription Narcotics:  · Limit your activities  · Do not drive and operate hazardous machinery  · Do not make important personal or business decisions  · Do  not drink alcoholic beverages  · If you have not urinated within 8 hours after discharge, please contact your surgeon on call.     Report the following to your surgeon:  · Excessive pain, swelling, redness or odor of or around the surgical area  · Temperature over 100.5  · Nausea and vomiting lasting longer than 4 hours or if unable to take medications  · Any signs of decreased circulation or nerve impairment to extremity: change in color, persistent  numbness, tingling, coldness or increase pain  · Any questions    What to do at Home:  Recommended activity: Activity as tolerated and no driving for today,     If you experience any of the following symptoms, please follow up with Emergency Department. *  Please give a list of your current medications to your Primary Care Provider. *  Please update this list whenever your medications are discontinued, doses are      changed, or new medications (including over-the-counter products) are added. *  Please carry medication information at all times in case of emergency situations. These are general instructions for a healthy lifestyle:    No smoking/ No tobacco products/ Avoid exposure to second hand smoke  Surgeon General's Warning:  Quitting smoking now greatly reduces serious risk to your health. Obesity, smoking, and sedentary lifestyle greatly increases your risk for illness    A healthy diet, regular physical exercise & weight monitoring are important for maintaining a healthy lifestyle    You may be retaining fluid if you have a history of heart failure or if you experience any of the following symptoms:  Weight gain of 3 pounds or more overnight or 5 pounds in a week, increased swelling in our hands or feet or shortness of breath while lying flat in bed. Please call your doctor as soon as you notice any of these symptoms; do not wait until your next office visit. The discharge information has been reviewed with the patient and caregiver. The patient and caregiver verbalized understanding. Discharge medications reviewed with the patient and caregiver and appropriate educational materials and side effects teaching were provided.   ___________________________________________________________________________________________________________________________________

## 2020-02-17 NOTE — ANESTHESIA POSTPROCEDURE EVALUATION
Post-Anesthesia Evaluation & Assessment    Visit Vitals  BP (P) 116/64 (BP 1 Location: Right arm, BP Patient Position: Head of bed elevated (Comment degrees))   Pulse (P) 77   Temp (P) 36.6 °C (97.8 °F)   Resp (P) 11   Ht 5' 8\" (1.727 m)   Wt 82.6 kg (182 lb 2 oz)   SpO2 (P) 97%   BMI 27.69 kg/m²       Nausea/Vomiting: no nausea and no vomiting    Post-operative hydration adequate. Pain score (VAS): 0    Mental status & Level of consciousness: alert and oriented x 3    Neurological status: moves all extremities, sensation grossly intact    Pulmonary status: airway patent, no supplemental oxygen required    Complications related to anesthesia: none    Patient has met all discharge requirements. Additional comments:        Gema Meier CRNA  February 17, 2020  * No procedures listed *.    general    <BSHSIANPOST>    No vitals data found for the desired time range.

## 2020-02-17 NOTE — DISCHARGE INSTRUCTIONS
Fiducial Markers Instructions    During the first few days you may have some bruising on the perineum (the place between your anus and your scrotum) or on the scrotum itself. This is caused by the needles (usually 2-3) which are used to place the Fiducial Markers. This will resolve on its own and usually does not cause any discomfort. For about a week after treatment, you may have some pain or swelling in the area between your scrotum and rectum, and your urine may be reddish-brown. Rarely a larger hematoma may form on the perineum and this will cause some discomfort with sitting. This should be brought to the doctors attention, but it will resolve on its own. You can alternate between an ice pack and heat pack for 10 minutes interval to assist with the reduction of inflammation and pain to perineum. Side Effects    Immediately post procedure: blood in the urine, bruising/tenderness/discoloration at the implant site, and/or swelling at the implant site. These symptoms should subside after a few days. Diet:      Regular, unless you are on a special diet for other reasons.      Medication:     Patient may continue the following medications as directed by Physician:      Adalat CC (90 mg) Tablet SR 24 HR Oral daily (2/12/2020)  Adult Aspirin EC Low Strength (81 mg) Tablet, enteric coated Oral daily (2/12/2020)  Ciprofloxacin HCl (500 mg) Tablet Oral Take as Directed (2/13/2020)  Ergocalciferol (1. 25 MG ) Capsule Oral q 7 days (2/12/2020)  Fleet Enema (7-19 gm/118mL) Enema Rectal Take as Directed (2/13/2020)  Flomax (0. 4 mg) Capsule Oral daily (2/12/2020)  Fluocinonide (0. 05 %) Cream Topical PRN (2/12/2020)  Glucophage 2 Tablet (of 500 mg) Tablet Oral b. i. d. (2/12/2020)  HydroCHLOROthiazide (25 mg) Tablet Oral daily (2/12/2020)  Klor-Con M10 (10 meq) Tablet, controlled release Oral daily (2/12/2020)  Micardis (20 mg) Tablet Oral daily (2/12/2020)  Mobic (7. 5 mg/5mL) Suspension Oral daily (2/12/2020)  Naproxen (500 mg) Tablet Oral Take as Directed (2/13/2020)  Simvastatin (40 mg) Tablet Oral daily (2/12/2020)  Viagra (100 mg) Tablet Oral PRN (2/12/2020)  Voltaren (1 %) Gel (jelly) Transdermal PRN (2/12/2020)    Activity:     Avoid heavy lifting or strenuous physical activity for the first two days after the procedure. At that time you may return to your normal activity level if the urine is clear and you feel fine. If the urine is still bloody you should rest and drink plenty of fluids until it is clear, and then you may resume normal activity. Depending on the demands of your job, you may return to work any time during the week after the procedure, noting the precaution about prolonged sitting. You may return to a regular diet as tolerated following the procedure. You will most likely experience a reddish color in your ejaculate for a few weeks following the procedure. This is normal and will improve as time  passes, if it persists, see your doctor. Follow up     Your follow up imaging will be at [facility] at [facility]/[address]  on _____at _____ am.    Please call us if you have any questions: [phone]    Radiation Therapy       DISCHARGE SUMMARY from Nurse    PATIENT INSTRUCTIONS:    After general anesthesia or intravenous sedation, for 24 hours or while taking prescription Narcotics:  · Limit your activities  · Do not drive and operate hazardous machinery  · Do not make important personal or business decisions  · Do  not drink alcoholic beverages  · If you have not urinated within 8 hours after discharge, please contact your surgeon on call.     Report the following to your surgeon:  · Excessive pain, swelling, redness or odor of or around the surgical area  · Temperature over 100.5  · Nausea and vomiting lasting longer than 4 hours or if unable to take medications  · Any signs of decreased circulation or nerve impairment to extremity: change in color, persistent  numbness, tingling, coldness or increase pain  · Any questions    What to do at Home:  Recommended activity: Activity as tolerated and no driving for today,     If you experience any of the following symptoms, please follow up with Emergency Department. *  Please give a list of your current medications to your Primary Care Provider. *  Please update this list whenever your medications are discontinued, doses are      changed, or new medications (including over-the-counter products) are added. *  Please carry medication information at all times in case of emergency situations. These are general instructions for a healthy lifestyle:    No smoking/ No tobacco products/ Avoid exposure to second hand smoke  Surgeon General's Warning:  Quitting smoking now greatly reduces serious risk to your health. Obesity, smoking, and sedentary lifestyle greatly increases your risk for illness    A healthy diet, regular physical exercise & weight monitoring are important for maintaining a healthy lifestyle    You may be retaining fluid if you have a history of heart failure or if you experience any of the following symptoms:  Weight gain of 3 pounds or more overnight or 5 pounds in a week, increased swelling in our hands or feet or shortness of breath while lying flat in bed. Please call your doctor as soon as you notice any of these symptoms; do not wait until your next office visit. The discharge information has been reviewed with the patient and caregiver. The patient and caregiver verbalized understanding. Discharge medications reviewed with the patient and caregiver and appropriate educational materials and side effects teaching were provided.   ___________________________________________________________________________________________________________________________________

## 2020-02-17 NOTE — PROGRESS NOTES
POST PROCEDURE NOTE    Pt arrived to post procedure area A at 0921 pt in supine with head of bed elevated and monitors placed. Patient voided 70ml of light reddish colored urine. Bladder scan performed with the patient having 0*ml of residual urine in the bladder. Patient discharged from procedure area A: 0958    Post Operative instruction and Discharge information reviewed and copy given to patient and grandson. The patient and grandson verbalized understanding. 9289 D/C'd via W/C with grandson driving transportation home.

## 2020-02-17 NOTE — ANESTHESIA PREPROCEDURE EVALUATION
Relevant Problems   No relevant active problems       Anesthetic History   No history of anesthetic complications            Review of Systems / Medical History  Patient summary reviewed, nursing notes reviewed and pertinent labs reviewed    Pulmonary  Within defined limits        Smoker         Neuro/Psych   Within defined limits           Cardiovascular  Within defined limits  Hypertension        Dysrhythmias            GI/Hepatic/Renal  Within defined limits              Endo/Other  Within defined limits  Diabetes: type 2    Arthritis     Other Findings              Physical Exam    Airway  Mallampati: II  TM Distance: 4 - 6 cm  Neck ROM: decreased range of motion   Mouth opening: Normal     Cardiovascular  Regular rate and rhythm,  S1 and S2 normal,  no murmur, click, rub, or gallop  Rhythm: regular  Rate: normal         Dental    Dentition: Edentulous     Pulmonary  Breath sounds clear to auscultation               Abdominal  Abdominal exam normal       Other Findings            Anesthetic Plan    ASA: 3  Anesthesia type: general            Anesthetic plan and risks discussed with: Patient and Family

## 2020-02-21 ENCOUNTER — HOSPITAL ENCOUNTER (OUTPATIENT)
Dept: MRI IMAGING | Age: 80
Discharge: HOME | End: 2020-02-21
Attending: RADIOLOGY
Payer: MEDICARE

## 2020-02-21 ENCOUNTER — HOSPITAL ENCOUNTER (OUTPATIENT)
Dept: RADIATION THERAPY | Age: 80
Discharge: HOME | End: 2020-02-21
Payer: MEDICARE

## 2020-02-21 DIAGNOSIS — C61 MALIGNANT NEOPLASM OF PROSTATE (HCC): ICD-10-CM

## 2020-02-21 PROCEDURE — 77334 RADIATION TREATMENT AID(S): CPT

## 2020-02-21 PROCEDURE — 76498 UNLISTED MR PROCEDURE: CPT

## 2020-02-24 ENCOUNTER — HOSPITAL ENCOUNTER (OUTPATIENT)
Dept: RADIATION THERAPY | Age: 80
Discharge: HOME | End: 2020-02-24
Payer: MEDICARE

## 2020-02-24 PROCEDURE — 77338 DESIGN MLC DEVICE FOR IMRT: CPT

## 2020-02-24 PROCEDURE — 77300 RADIATION THERAPY DOSE PLAN: CPT

## 2020-02-24 PROCEDURE — 77301 RADIOTHERAPY DOSE PLAN IMRT: CPT

## 2020-02-25 ENCOUNTER — HOSPITAL ENCOUNTER (OUTPATIENT)
Dept: RADIATION THERAPY | Age: 80
Discharge: HOME | End: 2020-02-25
Payer: MEDICARE

## 2020-02-25 PROCEDURE — 77300 RADIATION THERAPY DOSE PLAN: CPT

## 2020-02-25 PROCEDURE — 77338 DESIGN MLC DEVICE FOR IMRT: CPT

## 2020-02-25 PROCEDURE — 77385 HC IMRT TRMT DLVR SMPL: CPT

## 2020-02-26 ENCOUNTER — HOSPITAL ENCOUNTER (OUTPATIENT)
Dept: RADIATION THERAPY | Age: 80
Discharge: HOME | End: 2020-02-26
Payer: MEDICARE

## 2020-02-26 PROCEDURE — 77385 HC IMRT TRMT DLVR SMPL: CPT

## 2020-02-27 ENCOUNTER — HOSPITAL ENCOUNTER (OUTPATIENT)
Dept: RADIATION THERAPY | Age: 80
Discharge: HOME | End: 2020-02-27
Payer: MEDICARE

## 2020-02-27 PROCEDURE — 77385 HC IMRT TRMT DLVR SMPL: CPT

## 2020-02-28 ENCOUNTER — HOSPITAL ENCOUNTER (OUTPATIENT)
Dept: RADIATION THERAPY | Age: 80
Discharge: HOME | End: 2020-02-28
Payer: MEDICARE

## 2020-02-28 PROCEDURE — 77385 HC IMRT TRMT DLVR SMPL: CPT

## 2020-03-02 ENCOUNTER — HOSPITAL ENCOUNTER (OUTPATIENT)
Dept: RADIATION THERAPY | Age: 80
Discharge: HOME | End: 2020-03-02
Payer: MEDICARE

## 2020-03-02 PROCEDURE — 77385 HC IMRT TRMT DLVR SMPL: CPT

## 2020-03-02 PROCEDURE — 77336 RADIATION PHYSICS CONSULT: CPT

## 2020-03-03 ENCOUNTER — HOSPITAL ENCOUNTER (OUTPATIENT)
Dept: RADIATION THERAPY | Age: 80
Discharge: HOME | End: 2020-03-03
Payer: MEDICARE

## 2020-03-03 PROCEDURE — 77385 HC IMRT TRMT DLVR SMPL: CPT

## 2020-03-04 ENCOUNTER — HOSPITAL ENCOUNTER (OUTPATIENT)
Dept: RADIATION THERAPY | Age: 80
Discharge: HOME | End: 2020-03-04
Payer: MEDICARE

## 2020-03-04 PROCEDURE — 77385 HC IMRT TRMT DLVR SMPL: CPT

## 2020-03-05 ENCOUNTER — HOSPITAL ENCOUNTER (OUTPATIENT)
Dept: RADIATION THERAPY | Age: 80
Discharge: HOME | End: 2020-03-05
Payer: MEDICARE

## 2020-03-05 PROCEDURE — 77385 HC IMRT TRMT DLVR SMPL: CPT

## 2020-03-06 ENCOUNTER — HOSPITAL ENCOUNTER (OUTPATIENT)
Dept: RADIATION THERAPY | Age: 80
Discharge: HOME | End: 2020-03-06
Payer: MEDICARE

## 2020-03-06 PROCEDURE — 77385 HC IMRT TRMT DLVR SMPL: CPT

## 2020-03-09 ENCOUNTER — HOSPITAL ENCOUNTER (OUTPATIENT)
Dept: RADIATION THERAPY | Age: 80
Discharge: HOME | End: 2020-03-09
Payer: MEDICARE

## 2020-03-09 PROCEDURE — 77336 RADIATION PHYSICS CONSULT: CPT

## 2020-03-09 PROCEDURE — 77385 HC IMRT TRMT DLVR SMPL: CPT

## 2020-03-10 ENCOUNTER — HOSPITAL ENCOUNTER (OUTPATIENT)
Dept: RADIATION THERAPY | Age: 80
Discharge: HOME | End: 2020-03-10
Payer: MEDICARE

## 2020-03-10 PROCEDURE — 77385 HC IMRT TRMT DLVR SMPL: CPT

## 2020-03-11 ENCOUNTER — HOSPITAL ENCOUNTER (OUTPATIENT)
Dept: RADIATION THERAPY | Age: 80
Discharge: HOME | End: 2020-03-11
Payer: MEDICARE

## 2020-03-11 PROCEDURE — 77385 HC IMRT TRMT DLVR SMPL: CPT

## 2020-03-12 ENCOUNTER — HOSPITAL ENCOUNTER (OUTPATIENT)
Dept: RADIATION THERAPY | Age: 80
Discharge: HOME | End: 2020-03-12
Payer: MEDICARE

## 2020-03-13 ENCOUNTER — HOSPITAL ENCOUNTER (OUTPATIENT)
Dept: RADIATION THERAPY | Age: 80
Discharge: HOME | End: 2020-03-13
Payer: MEDICARE

## 2020-03-13 PROCEDURE — 77385 HC IMRT TRMT DLVR SMPL: CPT

## 2020-03-16 ENCOUNTER — HOSPITAL ENCOUNTER (OUTPATIENT)
Dept: RADIATION THERAPY | Age: 80
Discharge: HOME | End: 2020-03-16
Payer: MEDICARE

## 2020-03-16 PROCEDURE — 77385 HC IMRT TRMT DLVR SMPL: CPT

## 2020-03-17 ENCOUNTER — HOSPITAL ENCOUNTER (OUTPATIENT)
Dept: RADIATION THERAPY | Age: 80
Discharge: HOME | End: 2020-03-17
Payer: MEDICARE

## 2020-03-17 PROCEDURE — 77385 HC IMRT TRMT DLVR SMPL: CPT

## 2020-03-17 PROCEDURE — 77336 RADIATION PHYSICS CONSULT: CPT

## 2020-03-18 ENCOUNTER — HOSPITAL ENCOUNTER (OUTPATIENT)
Dept: RADIATION THERAPY | Age: 80
Discharge: HOME | End: 2020-03-18
Payer: MEDICARE

## 2020-03-18 PROCEDURE — 77385 HC IMRT TRMT DLVR SMPL: CPT

## 2020-03-19 ENCOUNTER — HOSPITAL ENCOUNTER (OUTPATIENT)
Dept: RADIATION THERAPY | Age: 80
Discharge: HOME | End: 2020-03-19
Payer: MEDICARE

## 2020-03-19 PROCEDURE — 77385 HC IMRT TRMT DLVR SMPL: CPT

## 2020-03-20 ENCOUNTER — HOSPITAL ENCOUNTER (OUTPATIENT)
Dept: RADIATION THERAPY | Age: 80
Discharge: HOME | End: 2020-03-20
Payer: MEDICARE

## 2020-03-20 PROCEDURE — 77385 HC IMRT TRMT DLVR SMPL: CPT

## 2020-03-23 ENCOUNTER — HOSPITAL ENCOUNTER (OUTPATIENT)
Dept: RADIATION THERAPY | Age: 80
Discharge: HOME | End: 2020-03-23
Payer: MEDICARE

## 2020-03-23 PROCEDURE — 77385 HC IMRT TRMT DLVR SMPL: CPT

## 2020-03-24 ENCOUNTER — HOSPITAL ENCOUNTER (OUTPATIENT)
Dept: RADIATION THERAPY | Age: 80
Discharge: HOME | End: 2020-03-24
Payer: MEDICARE

## 2020-03-24 PROCEDURE — 77385 HC IMRT TRMT DLVR SMPL: CPT

## 2020-03-25 ENCOUNTER — HOSPITAL ENCOUNTER (OUTPATIENT)
Dept: RADIATION THERAPY | Age: 80
Discharge: HOME | End: 2020-03-25
Payer: MEDICARE

## 2020-03-25 PROCEDURE — 77385 HC IMRT TRMT DLVR SMPL: CPT

## 2020-03-26 ENCOUNTER — HOSPITAL ENCOUNTER (OUTPATIENT)
Dept: RADIATION THERAPY | Age: 80
Discharge: HOME | End: 2020-03-26
Payer: MEDICARE

## 2020-03-26 PROCEDURE — 77385 HC IMRT TRMT DLVR SMPL: CPT

## 2020-03-27 ENCOUNTER — HOSPITAL ENCOUNTER (OUTPATIENT)
Dept: RADIATION THERAPY | Age: 80
Discharge: HOME | End: 2020-03-27
Payer: MEDICARE

## 2020-03-27 PROCEDURE — 77385 HC IMRT TRMT DLVR SMPL: CPT

## 2020-03-30 ENCOUNTER — HOSPITAL ENCOUNTER (OUTPATIENT)
Dept: RADIATION THERAPY | Age: 80
Discharge: HOME | End: 2020-03-30
Payer: MEDICARE

## 2020-03-30 PROCEDURE — 77385 HC IMRT TRMT DLVR SMPL: CPT

## 2020-03-31 ENCOUNTER — HOSPITAL ENCOUNTER (OUTPATIENT)
Dept: RADIATION THERAPY | Age: 80
Discharge: HOME | End: 2020-03-31
Payer: MEDICARE

## 2020-03-31 PROCEDURE — 77385 HC IMRT TRMT DLVR SMPL: CPT

## 2020-03-31 PROCEDURE — 77336 RADIATION PHYSICS CONSULT: CPT

## 2020-04-01 ENCOUNTER — HOSPITAL ENCOUNTER (OUTPATIENT)
Dept: RADIATION THERAPY | Age: 80
Discharge: HOME | End: 2020-04-01
Payer: MEDICARE

## 2020-04-01 PROCEDURE — 77385 HC IMRT TRMT DLVR SMPL: CPT

## 2020-04-02 ENCOUNTER — HOSPITAL ENCOUNTER (OUTPATIENT)
Dept: RADIATION THERAPY | Age: 80
Discharge: HOME | End: 2020-04-02
Payer: MEDICARE

## 2020-04-02 PROCEDURE — 77385 HC IMRT TRMT DLVR SMPL: CPT

## 2020-04-03 ENCOUNTER — HOSPITAL ENCOUNTER (OUTPATIENT)
Dept: RADIATION THERAPY | Age: 80
Discharge: HOME | End: 2020-04-03
Payer: MEDICARE

## 2020-04-03 PROCEDURE — 77385 HC IMRT TRMT DLVR SMPL: CPT

## 2020-04-06 ENCOUNTER — HOSPITAL ENCOUNTER (OUTPATIENT)
Dept: RADIATION THERAPY | Age: 80
Discharge: HOME | End: 2020-04-06
Payer: MEDICARE

## 2020-04-07 ENCOUNTER — HOSPITAL ENCOUNTER (OUTPATIENT)
Dept: RADIATION THERAPY | Age: 80
Discharge: HOME | End: 2020-04-07
Payer: MEDICARE

## 2020-09-01 ENCOUNTER — APPOINTMENT (OUTPATIENT)
Dept: RADIATION THERAPY | Age: 80
End: 2020-09-01

## 2020-10-01 ENCOUNTER — HOSPITAL ENCOUNTER (OUTPATIENT)
Dept: RADIATION THERAPY | Age: 80
Discharge: HOME | End: 2020-10-01
Payer: MEDICARE

## 2020-10-01 PROCEDURE — 99211 OFF/OP EST MAY X REQ PHY/QHP: CPT

## 2020-12-08 ENCOUNTER — IMPORTED ENCOUNTER (OUTPATIENT)
Dept: URBAN - METROPOLITAN AREA CLINIC 1 | Facility: CLINIC | Age: 80
End: 2020-12-08

## 2020-12-08 PROBLEM — Z79.84: Noted: 2020-12-08

## 2020-12-08 PROBLEM — H43.813: Noted: 2020-12-08

## 2020-12-08 PROBLEM — H35.033: Noted: 2020-12-08

## 2020-12-08 PROBLEM — H25.813: Noted: 2020-12-08

## 2020-12-08 PROBLEM — E11.9: Noted: 2020-12-08

## 2020-12-08 PROBLEM — H16.143: Noted: 2020-12-08

## 2020-12-08 PROBLEM — H04.123: Noted: 2020-12-08

## 2020-12-08 PROCEDURE — 92014 COMPRE OPH EXAM EST PT 1/>: CPT

## 2020-12-08 NOTE — PATIENT DISCUSSION
1.  DM Type II (Oral Medication) without sign of diabetic retinopathy and no blot heme on dilated retinal examination today OU No Macular Edema:  Discussed the pathophysiology of diabetes and its effect on the eye and risk of blindness. Stressed the importance of strong glucose control. Advised of importance of at least yearly dilated examinations but to contact us immediately for any problems or concerns. 2. Cataract OU: Observe for now without intervention. The patient was advised to contact us if any change or worsening of vision3. DAILY w/ PEK OU- Recommend ATs TID OU routinely 4. GR I Hypertensive Retinopathy OU- Stable continue HTN Control5. PVD OU - RD precautions. Patient deferred Manifest Rx today. Return for an appointment in 6 months 10/DFE/[place] with Dr. Jamal Arroyo.

## 2021-06-08 ENCOUNTER — IMPORTED ENCOUNTER (OUTPATIENT)
Dept: URBAN - METROPOLITAN AREA CLINIC 1 | Facility: CLINIC | Age: 81
End: 2021-06-08

## 2021-06-08 PROBLEM — H25.813: Noted: 2021-06-08

## 2021-06-08 PROBLEM — H16.143: Noted: 2021-06-08

## 2021-06-08 PROBLEM — H04.123: Noted: 2021-06-08

## 2021-06-08 PROCEDURE — 99213 OFFICE O/P EST LOW 20 MIN: CPT

## 2021-07-01 ENCOUNTER — IMPORTED ENCOUNTER (OUTPATIENT)
Dept: URBAN - METROPOLITAN AREA CLINIC 1 | Facility: CLINIC | Age: 81
End: 2021-07-01

## 2021-07-01 PROBLEM — H25.812: Noted: 2021-07-01

## 2021-07-01 PROCEDURE — 92136 OPHTHALMIC BIOMETRY: CPT

## 2021-07-01 NOTE — PATIENT DISCUSSION
1. Cataract OS:  Visually Significant secondary to glare discussed the risks benefits alternatives and limitations of cataract surgery. The patient stated a full understanding and a desire to proceed with the procedure. Discussed with patient if PO Gtts are more than $120 for all three combined when filling at their Pharmacy please call our office to request generic substitutions. Pt understands they will need glasses post-op to achieve their best corrected vision. Phaco PCL OS  Lifestyle Questionnaire Completed. Return for an appointment for Return as scheduled with Dr. Campos Crawford.

## 2021-07-14 ENCOUNTER — IMPORTED ENCOUNTER (OUTPATIENT)
Dept: URBAN - METROPOLITAN AREA CLINIC 1 | Facility: CLINIC | Age: 81
End: 2021-07-14

## 2021-07-15 ENCOUNTER — IMPORTED ENCOUNTER (OUTPATIENT)
Dept: URBAN - METROPOLITAN AREA CLINIC 1 | Facility: CLINIC | Age: 81
End: 2021-07-15

## 2021-07-15 PROBLEM — Z96.1: Noted: 2021-07-15

## 2021-07-15 PROCEDURE — 99024 POSTOP FOLLOW-UP VISIT: CPT

## 2021-07-15 NOTE — PATIENT DISCUSSION
POD#1 CE/IOL Standard OS doing well. *Dextenza in place Use Tobradex ST BID OS Prolensa Qdaily OS. Use all three gtts through completion of PO gtt chart regimen/ Per our instructions given to patient.   Post op Warnings Reiterated RTC as scheduled

## 2021-07-22 ENCOUNTER — IMPORTED ENCOUNTER (OUTPATIENT)
Dept: URBAN - METROPOLITAN AREA CLINIC 1 | Facility: CLINIC | Age: 81
End: 2021-07-22

## 2021-07-22 PROBLEM — H25.811: Noted: 2021-07-22

## 2021-07-22 PROCEDURE — 92136 OPHTHALMIC BIOMETRY: CPT

## 2021-07-22 NOTE — PATIENT DISCUSSION
1.  Cataract OD: Visually Significant secondary to glare discussed the risks benefits alternatives and limitations of cataract surgery. The patient stated a full understanding and a desire to proceed with the procedure. Discussed with patient if PO Gtts are more than $120 for all three combined when filling at their Pharmacy please call our office to request generic substitutions. Pt understands they will need glasses post-op to achieve their best corrected vision. Phaco PCL OD 2. POW#3  CE/IOL OS (Standard) doing well. Use Tobradex ST BID OS & Prolensa Qdaily OS: Use through completion of po gtt regimen.  F/u as scheduled 2nd eye

## 2021-07-28 ENCOUNTER — IMPORTED ENCOUNTER (OUTPATIENT)
Dept: URBAN - METROPOLITAN AREA CLINIC 1 | Facility: CLINIC | Age: 81
End: 2021-07-28

## 2021-07-29 ENCOUNTER — IMPORTED ENCOUNTER (OUTPATIENT)
Dept: URBAN - METROPOLITAN AREA CLINIC 1 | Facility: CLINIC | Age: 81
End: 2021-07-29

## 2021-07-29 PROBLEM — Z96.1: Noted: 2021-07-29

## 2021-07-29 PROCEDURE — 99024 POSTOP FOLLOW-UP VISIT: CPT

## 2021-07-29 NOTE — PATIENT DISCUSSION
1. POD#1 Phaco/ PCL OD (Standard) - doing well. *Dextenza in place Use Tobradex ST BID OD Prolensa Qdaily OD : Use all three gtts through completion of PO gtt chart regimen/ Per our instructions given. Post op Warnings Reiterated 2. POW#3 Phaco/ PCL OS (Standard) - doing well. *Dextenza in place  Use Tobradex ST BID OS & Prolensa Qdaily OS: Use through completion of po gtt regimen.  RTC as scheduled

## 2021-09-23 ENCOUNTER — IMPORTED ENCOUNTER (OUTPATIENT)
Dept: URBAN - METROPOLITAN AREA CLINIC 1 | Facility: CLINIC | Age: 81
End: 2021-09-23

## 2021-09-23 PROBLEM — Z96.1: Noted: 2021-09-23

## 2021-09-23 PROCEDURE — 99024 POSTOP FOLLOW-UP VISIT: CPT

## 2021-09-23 NOTE — PATIENT DISCUSSION
POM#1 CE/IOL OU (Standard OU) doing well. *Dextenza in place  Use Tobradex ST BID OD & Prolensa Qdaily OD: Use through completion of po gtt regimen. MRX for glasses given. Return for an appointment in 6 months 27 with Dr. Sin Conn.

## 2022-04-02 ASSESSMENT — VISUAL ACUITY
OS_CC: 20/25
OD_SC: 20/20
OD_GLARE: 20/60
OS_SC: 20/20
OD_CC: 20/20
OD_SC: 20/25
OS_SC: 20/20
OS_SC: 20/25
OS_GLARE: 20/60
OS_GLARE: 20/60
OD_CC: 20/30
OS_CC: 20/25-1
OD_CC: J1
OS_SC: 20/20-1
OD_SC: 20/25
OS_CC: J1
OS_CC: 20/25
OD_SC: 20/20
OD_SC: 20/20
OS_CC: 20/50
OD_GLARE: 20/60
OD_SC: 20/20
OS_SC: 20/20
OS_SC: 20/25
OS_CC: 20/25
OS_SC: 20/30
OD_SC: 20/25
OS_CC: 20/40
OD_GLARE: 20/60
OD_CC: 20/30
OD_CC: J1
OS_CC: J1
OS_GLARE: 20/60
OD_GLARE: 20/60
OS_PH: SC 20/30 -2
OD_GLARE: 20/60
OD_CC: J1
OS_CC: J1
OD_CC: 20/25
OS_GLARE: 20/60
OS_GLARE: 20/60

## 2022-04-02 ASSESSMENT — TONOMETRY
OS_IOP_MMHG: 17
OS_IOP_MMHG: 17
OD_IOP_MMHG: 18
OS_IOP_MMHG: 19
OD_IOP_MMHG: 17
OS_IOP_MMHG: 17
OD_IOP_MMHG: 15
OS_IOP_MMHG: 16
OS_IOP_MMHG: 17
OD_IOP_MMHG: 17
OS_IOP_MMHG: 12
OD_IOP_MMHG: 17
OD_IOP_MMHG: 16
OS_IOP_MMHG: 12
OD_IOP_MMHG: 12
OD_IOP_MMHG: 18
OS_IOP_MMHG: 17
OS_IOP_MMHG: 18

## 2023-12-18 NOTE — PATIENT DISCUSSION
1.  DAILY w/ PEK OU - Recommend ATs TID OU routinely 2. Cataract OU - Observe for now without intervention. The patient was advised to contact us if any change or worsening of vision3. DM Type II (Oral Medication) without sign of diabetic retinopathy and no blot heme on dilated retinal examination today OU No Macular Edema:  Discussed the pathophysiology of diabetes and its effect on the eye and risk of blindness. Stressed the importance of strong glucose control. Advised of importance of at least yearly dilated examinations but to contact us immediately for any problems or concerns. 4. GR I Hypertensive Retinopathy OU- Stable continue HTN Control5. PVD OU - RD precautions. done

## (undated) DEVICE — NDL PRT INJ NSAF BLNT 18GX1.5 --

## (undated) DEVICE — GAUZE,SPONGE,4"X4",12PLY,STERILE,LF,2'S: Brand: MEDLINE

## (undated) DEVICE — TABLE COVER: Brand: CONVERTORS

## (undated) DEVICE — TEMPLATE BRACHYTHERAPY 17GA GRID DISP FOR ACCUCARE POS AND

## (undated) DEVICE — SOLUTION IRRIG 1000ML H2O STRL BLT

## (undated) DEVICE — TOWEL SURG W16XL26IN BLU NONFENESTRATED DLX ST 2 PER PK

## (undated) DEVICE — MAX-CORE® DISPOSABLE CORE BIOPSY INSTRUMENT, 18G X 20CM: Brand: MAX-CORE

## (undated) DEVICE — STERILE POLYISOPRENE POWDER-FREE SURGICAL GLOVES: Brand: PROTEXIS

## (undated) DEVICE — Z DISCONTINUED NO SUB IDED KIT US GEL STANDOFF

## (undated) DEVICE — NEEDLE HYPO 25GA L1.5IN BVL ORIENTED ECLIPSE

## (undated) DEVICE — KENDALL SCD EXPRESS SLEEVES, KNEE LENGTH, MEDIUM: Brand: KENDALL SCD

## (undated) DEVICE — SKIN MARKER,REGULAR TIP WITH RULER AND LABELS: Brand: DEVON

## (undated) DEVICE — SYR 10ML CTRL LR LCK NSAF LF --

## (undated) DEVICE — NON-STERILE (2 X 14CM) ENDOCAVITY BALLOON FOR USE WITH ACCUCARE POSITIONING AND STABILIZING EQUIPMENT: Brand: ENDOCAVITY BALLOON